# Patient Record
Sex: FEMALE | Race: WHITE
[De-identification: names, ages, dates, MRNs, and addresses within clinical notes are randomized per-mention and may not be internally consistent; named-entity substitution may affect disease eponyms.]

---

## 2018-08-16 ENCOUNTER — HOSPITAL ENCOUNTER (EMERGENCY)
Dept: HOSPITAL 56 - MW.ED | Age: 18
Discharge: HOME | End: 2018-08-16
Payer: SELF-PAY

## 2018-08-16 DIAGNOSIS — E86.0: ICD-10-CM

## 2018-08-16 DIAGNOSIS — Z3A.10: ICD-10-CM

## 2018-08-16 DIAGNOSIS — O21.0: Primary | ICD-10-CM

## 2018-08-16 DIAGNOSIS — O99.281: ICD-10-CM

## 2018-08-16 LAB
CHLORIDE SERPL-SCNC: 102 MMOL/L (ref 98–107)
SODIUM SERPL-SCNC: 137 MMOL/L (ref 136–145)

## 2018-08-16 PROCEDURE — 80053 COMPREHEN METABOLIC PANEL: CPT

## 2018-08-16 PROCEDURE — 96361 HYDRATE IV INFUSION ADD-ON: CPT

## 2018-08-16 PROCEDURE — 96374 THER/PROPH/DIAG INJ IV PUSH: CPT

## 2018-08-16 PROCEDURE — 99284 EMERGENCY DEPT VISIT MOD MDM: CPT

## 2018-08-16 PROCEDURE — 36415 COLL VENOUS BLD VENIPUNCTURE: CPT

## 2018-08-16 PROCEDURE — 81001 URINALYSIS AUTO W/SCOPE: CPT

## 2018-08-16 PROCEDURE — 85025 COMPLETE CBC W/AUTO DIFF WBC: CPT

## 2018-08-16 NOTE — EDM.PDOC
ED HPI GENERAL MEDICAL PROBLEM





- General


Chief Complaint: Gastrointestinal Problem


Stated Complaint: 10 WKS PREGNANT AND VOMITING


Time Seen by Provider: 08/16/18 14:43





- History of Present Illness


INITIAL COMMENTS - FREE TEXT/NARRATIVE: 


HISTORY AND PHYSICAL:





History of present illness:


Patient's a 18-year-old female is approximately 10 weeks pregnant who said a 

first trimester ultrasound that showed a normal pregnancy was also suffered 

from hyperemesis related to her pregnancy. She has been on Zofran as an 

outpatient states this is gotten somewhat worse over last several days and her 

main concern is dehydration she's had no vaginal bleeding discharge her other 

problems.





Review of systems: 


As per history of present illness and below otherwise all systems reviewed and 

negative.





Past medical history: 


As per history of present illness and as reviewed below otherwise 

noncontributory.





Surgical history: 


As per history of present illness and as reviewed below otherwise 

noncontributory.





Social history: 


No reported history of drug or alcohol abuse.





Family history: 


As per history of present illness and as reviewed below otherwise 

noncontributory.





Physical exam:


HEENT: Atraumatic, normocephalic, pupils reactive, negative for conjunctival 

pallor or scleral icterus, mucous membranes dry, throat clear, neck supple, 

nontender, trachea midline.


Lungs: Clear to auscultation, breath sounds equal bilaterally, chest nontender.


Heart: S1S2, regular, negative for clicks, rubs, or JVD.


Abdomen: Soft, nondistended, nontender. Negative for masses or 

hepatosplenomegaly. Negative for costovertebral tenderness.


Pelvis: Stable nontender.


Genitourinary: Deferred.


Rectal: Deferred.


Extremities: Atraumatic, negative for cords or calf pain. Neurovascular 

unremarkable.


Neuro: Awake, alert, oriented. Cranial nerves II through XII unremarkable. 

Cerebellum unremarkable. Motor and sensory unremarkable throughout. Exam 

nonfocal.





Diagnostics:


CBC CMP UA





Therapeutics:


Saline 1 L bolus Zofran 4 mg IV





Impression: 


#1 hyperemesis gravidarum with dehydration





Definitive disposition and diagnosis as appropriate pending reevaluation and 

review of above.


  ** Abdomen


Pain Score (Numeric/FACES): 3





- Related Data


 Allergies











Allergy/AdvReac Type Severity Reaction Status Date / Time


 


No Known Allergies Allergy   Verified 08/16/18 14:55











Home Meds: 


 Home Meds





Pnv No.122/Iron/Folic Acid [Prenatal Multi Tablet] 1 tab PO DAILY 08/16/18 [

History]











Past Medical History





- Past Health History


Medical/Surgical History: Denies Medical/Surgical History





Social & Family History





- Family History


Family Medical History: Noncontributory





- Caffeine Use


Caffeine Use: Reports: None





ED ROS GENERAL





- Review of Systems


Review Of Systems: ROS reveals no pertinent complaints other than HPI.





ED EXAM, GENERAL





- Physical Exam


Exam: See Below (See dictation)





Course





- Vital Signs


Last Recorded V/S: 


 Last Vital Signs











Temp  36.5 C   08/16/18 14:48


 


Pulse  83   08/16/18 14:48


 


Resp  14   08/16/18 14:48


 


BP  121/66   08/16/18 14:48


 


Pulse Ox  99   08/16/18 14:48














- Orders/Labs/Meds


Orders: 


 Active Orders 24 hr











 Category Date Time Status


 


 UA W/MICROSCOPIC [URIN] Stat Lab  08/16/18 16:00 Ordered


 


 Sodium Chloride 0.9% [Normal Saline] 1,000 ml Med  08/16/18 15:46 Active





 IV .Bolus   








 Medication Orders





Sodium Chloride (Normal Saline)  1,000 mls @ 999 mls/hr IV .Bolus ONE


   Stop: 08/16/18 16:46


   Last Admin: 08/16/18 16:16  Dose: 999 mls/hr








Labs: 


 Laboratory Tests











  08/16/18 08/16/18 08/16/18 Range/Units





  15:10 15:10 16:00 


 


WBC  16.89 H    (4.0-11.0)  K/uL


 


RBC  5.03    (4.30-5.90)  M/uL


 


Hgb  12.6    (12.0-16.0)  g/dL


 


Hct  36.3    (36.0-46.0)  %


 


MCV  72.2 L    (80.0-98.0)  fL


 


MCH  25.0 L    (27.0-32.0)  pg


 


MCHC  34.7    (31.0-37.0)  g/dL


 


RDW Std Deviation  44.5    (28.0-62.0)  fl


 


RDW Coeff of Bere  17 H    (11.0-15.0)  %


 


Plt Count  375    (150-400)  K/uL


 


MPV  10.10    (7.40-12.00)  fL


 


Neut % (Auto)  89.5 H    (48.0-80.0)  %


 


Lymph % (Auto)  3.2 L    (16.0-40.0)  %


 


Mono % (Auto)  7.1    (0.0-15.0)  %


 


Eos % (Auto)  0.1    (0.0-7.0)  %


 


Baso % (Auto)  0.1    (0.0-1.5)  %


 


Neut # (Auto)  15.1 H    (1.4-5.7)  K/uL


 


Lymph # (Auto)  0.5 L    (0.6-2.4)  K/uL


 


Mono # (Auto)  1.2 H    (0.0-0.8)  K/uL


 


Eos # (Auto)  0.0    (0.0-0.7)  K/uL


 


Baso # (Auto)  0.0    (0.0-0.1)  K/uL


 


Nucleated RBC %  0.0    /100WBC


 


Nucleated RBCs #  0    K/uL


 


Sodium   137   (136-145)  mmol/L


 


Potassium   3.7   (3.5-5.1)  mmol/L


 


Chloride   102   ()  mmol/L


 


Carbon Dioxide   23.7   (21.0-32.0)  mmol/L


 


BUN   7   (7.0-18.0)  mg/dL


 


Creatinine   0.5 L   (0.6-1.0)  mg/dL


 


Est Cr Clr Drug Dosing   157.82   mL/min


 


Estimated GFR (MDRD)   > 60.0   ml/min


 


Glucose   93   ()  mg/dL


 


Calcium   9.6   (8.5-10.1)  mg/dL


 


Total Bilirubin   0.3   (0.2-1.0)  mg/dL


 


AST   29   (15-37)  IU/L


 


ALT   47   (14-63)  IU/L


 


Alkaline Phosphatase   41 L   ()  U/L


 


Total Protein   7.5   (6.4-8.2)  g/dL


 


Albumin   3.4   (3.4-5.0)  g/dL


 


Globulin   4.1 H   (2.0-3.5)  g/dL


 


Albumin/Globulin Ratio   0.8 L   (1.3-2.8)  


 


Urine Color    YELLOW  


 


Urine Appearance    CLEAR  


 


Urine pH    6.0  (5.0-8.0)  


 


Ur Specific Gravity    1.025  (1.001-1.035)  


 


Urine Protein    NEGATIVE  (NEGATIVE)  mg/dL


 


Urine Glucose (UA)    NEGATIVE  (NEGATIVE)  mg/dL


 


Urine Ketones    NEGATIVE  (NEGATIVE)  mg/dL


 


Urine Occult Blood    NEGATIVE  (NEGATIVE)  


 


Urine Nitrite    NEGATIVE  (NEGATIVE)  


 


Urine Bilirubin    NEGATIVE  (NEGATIVE)  


 


Urine Urobilinogen    1.0  (<2.0)  EU/dL


 


Ur Leukocyte Esterase    NEGATIVE  (NEGATIVE)  


 


Urine RBC    0-2  (0-2/HPF)  


 


Urine WBC    0-2  (0-5/HPF)  


 


Ur Epithelial Cells    MODERATE  (NONE-FEW)  


 


Amorphous Sediment    FEW  (NEGATIVE)  


 


Urine Bacteria    FEW  (NEGATIVE)  











Meds: 


Medications











Generic Name Dose Route Start Last Admin





  Trade Name Freq  PRN Reason Stop Dose Admin


 


Sodium Chloride  1,000 mls @ 999 mls/hr  08/16/18 15:46  08/16/18 16:16





  Normal Saline  IV  08/16/18 16:46  999 mls/hr





  .Bolus ONE   Administration





     





     





     





     














Discontinued Medications














Generic Name Dose Route Start Last Admin





  Trade Name Freq  PRN Reason Stop Dose Admin


 


Sodium Chloride  1,000 mls @ 999 mls/hr  08/16/18 14:54  08/16/18 15:17





  Normal Saline  IV  08/16/18 15:54  999 mls/hr





  STAT ONE   Administration





     





     





     





     


 


Ondansetron HCl  4 mg  08/16/18 14:54  08/16/18 15:19





  Zofran  IVPUSH  08/16/18 14:55  4 mg





  ONETIME ONE   Administration





     





     





     





     














Departure





- Departure


Time of Disposition: 16:26


Disposition: Home, Self-Care 01


Condition: Good


Clinical Impression: 


 Hyperemesis gravidarum, Dehydration








- Discharge Information


Referrals: 


PCP,None [Primary Care Provider] - 


Forms:  ED Department Discharge


Additional Instructions: 


The following information is given to patients seen in the emergency department 

who are being discharged to home. This information is to outline your options 

for follow-up care. We provide all patients seen in our emergency department 

with a follow-up referral.





The need for follow-up, as well as the timing and circumstances, are variable 

depending upon the specifics of your emergency department visit.





If you don't have a primary care physician on staff, we will provide you with a 

referral. We always advise you to contact your personal physician following an 

emergency department visit to inform them of the circumstance of the visit and 

for follow-up with them and/or the need for any referrals to a consulting 

specialist.





The emergency department will also refer you to a specialist when appropriate. 

This referral assures that you have the opportunity for followup care with a 

specialist. All of these measure are taken in an effort to provide you with 

optimal care, which includes your followup.





Under all circumstances we always encourage you to contact your private 

physician who remains a resource for coordinating  your care. When calling for 

followup care, please make the office aware that this follow-up is from your 

recent emergency room visit. If for any reason you are refused follow-up, 

please contact the [facility] emergency department at [phone] 

and asked to speak to the emergency department charge nurse.














Push fluids clear liquids as directed medications as prescribed follow-up with 

OB gynecology as discussed return as needed as discussed





- My Orders


Last 24 Hours: 


My Active Orders





08/16/18 15:46


Sodium Chloride 0.9% [Normal Saline] 1,000 ml IV .Bolus 





08/16/18 16:00


UA W/MICROSCOPIC [URIN] Stat 














- Assessment/Plan


Last 24 Hours: 


My Active Orders





08/16/18 15:46


Sodium Chloride 0.9% [Normal Saline] 1,000 ml IV .Bolus 





08/16/18 16:00


UA W/MICROSCOPIC [URIN] Stat